# Patient Record
Sex: MALE | Race: WHITE | NOT HISPANIC OR LATINO | URBAN - METROPOLITAN AREA
[De-identification: names, ages, dates, MRNs, and addresses within clinical notes are randomized per-mention and may not be internally consistent; named-entity substitution may affect disease eponyms.]

---

## 2020-06-15 ENCOUNTER — CLAIMS CREATED FROM THE CLAIM WINDOW (OUTPATIENT)
Dept: URBAN - METROPOLITAN AREA TELEHEALTH 2 | Facility: TELEHEALTH | Age: 80
End: 2020-06-15
Payer: MEDICARE

## 2020-06-15 DIAGNOSIS — E11.43 TYPE 2 DIABETES MELLITUS WITH DIABETIC AUTONOMIC (POLY)NEUROPATHY: ICD-10-CM

## 2020-06-15 PROCEDURE — 99213 OFFICE O/P EST LOW 20 MIN: CPT | Performed by: INTERNAL MEDICINE

## 2020-06-15 RX ORDER — INSULIN LISPRO 100 [IU]/ML
SUBCUTANEOUSLY INJECT 10 UNITS INTO SKIN 3 TIMES A DAY INJECTION, SOLUTION INTRAVENOUS; SUBCUTANEOUS
Qty: 27 | Refills: 3 | Status: ACTIVE | COMMUNITY
Start: 2020-03-16 | End: 1900-01-01

## 2020-06-15 RX ORDER — RAMIPRIL 10 MG/1
TAKE 2 CAPSULES BY MOUTH ONCE DAILY FOR 90 DAYS CAPSULE ORAL
Qty: 180 | Refills: 0 | Status: ACTIVE | COMMUNITY
Start: 2020-04-06 | End: 1900-01-01

## 2020-06-15 RX ORDER — ASPIRIN 81 MG/1
TABLET, CHEWABLE ORAL
Qty: 0 | Refills: 0 | Status: ACTIVE | COMMUNITY
Start: 1900-01-01 | End: 1900-01-01

## 2020-06-15 RX ORDER — SAXAGLIPTIN 5 MG/1
TAKE 1 TABLET BY MOUTH DAILY TABLET, FILM COATED ORAL
Qty: 90 | Refills: 3 | Status: ACTIVE | COMMUNITY
Start: 2020-04-22 | End: 1900-01-01

## 2020-06-15 RX ORDER — TRIMETHOPRIM 100 MG/1
TABLET ORAL
Qty: 0 | Refills: 0 | Status: ACTIVE | COMMUNITY
Start: 1900-01-01 | End: 1900-01-01

## 2020-06-15 RX ORDER — AMLODIPINE BESYLATE 10 MG/1
TAKE 1 TABLET BY MOUTH EVERY DAY TABLET ORAL
Qty: 90 | Refills: 4 | Status: ACTIVE | COMMUNITY
Start: 2019-11-07 | End: 1900-01-01

## 2020-06-30 ENCOUNTER — ERX REFILL RESPONSE (OUTPATIENT)
Age: 80
End: 2020-06-30

## 2020-06-30 RX ORDER — RAMIPRIL 10 MG/1
TAKE 2 CAPSULES BY MOUTH ONCE DAILY FOR 90 DAYS CAPSULE ORAL
Qty: 180 | Refills: 0

## 2020-08-10 ENCOUNTER — OFFICE VISIT (OUTPATIENT)
Dept: URBAN - METROPOLITAN AREA CLINIC 92 | Facility: CLINIC | Age: 80
End: 2020-08-10
Payer: MEDICARE

## 2020-08-10 VITALS
HEIGHT: 69 IN | HEART RATE: 75 BPM | SYSTOLIC BLOOD PRESSURE: 155 MMHG | TEMPERATURE: 95.9 F | BODY MASS INDEX: 27.76 KG/M2 | DIASTOLIC BLOOD PRESSURE: 70 MMHG | WEIGHT: 187.4 LBS

## 2020-08-10 DIAGNOSIS — E11.43 TYPE 2 DIABETES MELLITUS WITH DIABETIC AUTONOMIC (POLY)NEUROPATHY: ICD-10-CM

## 2020-08-10 DIAGNOSIS — K31.84 GASTROPARESIS: ICD-10-CM

## 2020-08-10 DIAGNOSIS — C90.00 MULTIPLE MYELOMA, REMISSION STATUS UNSPECIFIED: ICD-10-CM

## 2020-08-10 DIAGNOSIS — D64.89 ANEMIA DUE TO OTHER CAUSE, NOT CLASSIFIED: ICD-10-CM

## 2020-08-10 PROCEDURE — 99213 OFFICE O/P EST LOW 20 MIN: CPT | Performed by: INTERNAL MEDICINE

## 2020-08-10 RX ORDER — ASPIRIN 81 MG/1
TABLET, CHEWABLE ORAL
Qty: 0 | Refills: 0 | Status: ACTIVE | COMMUNITY
Start: 1900-01-01 | End: 1900-01-01

## 2020-08-10 RX ORDER — INSULIN LISPRO 100 [IU]/ML
SUBCUTANEOUSLY INJECT 10 UNITS INTO SKIN 3 TIMES A DAY INJECTION, SOLUTION INTRAVENOUS; SUBCUTANEOUS
Qty: 27 | Refills: 3 | Status: ACTIVE | COMMUNITY
Start: 2020-03-16 | End: 1900-01-01

## 2020-08-10 RX ORDER — TRIMETHOPRIM 100 MG/1
TABLET ORAL
Qty: 0 | Refills: 0 | Status: ACTIVE | COMMUNITY
Start: 1900-01-01 | End: 1900-01-01

## 2020-08-10 RX ORDER — AMLODIPINE BESYLATE 10 MG/1
TAKE 1 TABLET BY MOUTH EVERY DAY TABLET ORAL
Qty: 90 | Refills: 4 | Status: ACTIVE | COMMUNITY
Start: 2019-11-07 | End: 1900-01-01

## 2020-08-10 RX ORDER — RAMIPRIL 10 MG/1
TAKE 2 CAPSULES BY MOUTH ONCE DAILY FOR 90 DAYS CAPSULE ORAL
Qty: 180 | Refills: 0 | Status: ACTIVE | COMMUNITY

## 2020-08-10 RX ORDER — SAXAGLIPTIN 5 MG/1
TAKE 1 TABLET BY MOUTH DAILY TABLET, FILM COATED ORAL
Qty: 90 | Refills: 3 | Status: ACTIVE | COMMUNITY
Start: 2020-04-22 | End: 1900-01-01

## 2020-08-11 LAB
BANDS: 1
BASO (ABSOLUTE): 0.2
BASOS: 4
BLASTS/BLAST LIKE CELLS: (no result)
EOS (ABSOLUTE): 0.9
EOS: 24
HEMATOCRIT: 38
HEMATOLOGY COMMENTS:: (no result)
HEMOGLOBIN: 12.5
IMMATURE CELLS: (no result)
IMMATURE GRANS (ABS): (no result)
IMMATURE GRANULOCYTES: (no result)
IRON BIND.CAP.(TIBC): 342
IRON SATURATION: 32
IRON: 110
LYMPHS (ABSOLUTE): 0.8
LYMPHS: 22
MCH: 33.4
MCHC: 32.9
MCV: 102
MEGAKARYOCYTES: (no result)
METAMYELOCYTES: (no result)
MONOCYTES(ABSOLUTE): 0.8
MONOCYTES: 22
MYELOCYTES: (no result)
NEUTROPHILS (ABSOLUTE): 1.1
NEUTROPHILS: 27
NRBC: (no result)
OTHER, LINEAGE UNCERTAIN: (no result)
PLATELETS: 94
PROMYELOCYTES: (no result)
RBC: 3.74
RDW: 15.4
UIBC: 232
WBC: 3.8

## 2020-09-28 ENCOUNTER — ERX REFILL RESPONSE (OUTPATIENT)
Age: 80
End: 2020-09-28

## 2020-09-28 RX ORDER — RAMIPRIL 10 MG/1
TAKE 2 CAPSULES BY MOUTH ONCE DAILY FOR 90 DAYS CAPSULE ORAL
Qty: 180 | Refills: 0

## 2020-10-19 ENCOUNTER — ERX REFILL RESPONSE (OUTPATIENT)
Age: 80
End: 2020-10-19

## 2020-10-19 RX ORDER — AMLODIPINE BESYLATE 10 MG/1
TAKE 1 TABLET BY MOUTH EVERY DAY TABLET ORAL
Qty: 90 | Refills: 0

## 2020-11-09 ENCOUNTER — OFFICE VISIT (OUTPATIENT)
Dept: URBAN - METROPOLITAN AREA CLINIC 92 | Facility: CLINIC | Age: 80
End: 2020-11-09

## 2020-11-30 ENCOUNTER — DASHBOARD ENCOUNTERS (OUTPATIENT)
Age: 80
End: 2020-11-30

## 2020-11-30 ENCOUNTER — OFFICE VISIT (OUTPATIENT)
Dept: URBAN - METROPOLITAN AREA CLINIC 92 | Facility: CLINIC | Age: 80
End: 2020-11-30
Payer: MEDICARE

## 2020-11-30 DIAGNOSIS — C90.00 MULTIPLE MYELOMA, REMISSION STATUS UNSPECIFIED: ICD-10-CM

## 2020-11-30 DIAGNOSIS — D64.89 ANEMIA DUE TO OTHER CAUSE, NOT CLASSIFIED: ICD-10-CM

## 2020-11-30 DIAGNOSIS — E11.43 TYPE 2 DIABETES MELLITUS WITH DIABETIC AUTONOMIC (POLY)NEUROPATHY: ICD-10-CM

## 2020-11-30 DIAGNOSIS — K31.84 GASTROPARESIS: ICD-10-CM

## 2020-11-30 PROBLEM — 271737000: Status: ACTIVE | Noted: 2020-08-10

## 2020-11-30 PROBLEM — 713704004: Status: ACTIVE | Noted: 2020-06-15

## 2020-11-30 PROCEDURE — 99214 OFFICE O/P EST MOD 30 MIN: CPT | Performed by: INTERNAL MEDICINE

## 2020-11-30 RX ORDER — ASPIRIN 81 MG/1
TABLET, CHEWABLE ORAL
Qty: 0 | Refills: 0 | Status: ACTIVE | COMMUNITY
Start: 1900-01-01 | End: 1900-01-01

## 2020-11-30 RX ORDER — RAMIPRIL 10 MG/1
TAKE 2 CAPSULES BY MOUTH ONCE DAILY FOR 90 DAYS CAPSULE ORAL
Qty: 180 | Refills: 0 | Status: ACTIVE | COMMUNITY

## 2020-11-30 RX ORDER — TRIMETHOPRIM 100 MG/1
TABLET ORAL
Qty: 0 | Refills: 0 | Status: ACTIVE | COMMUNITY
Start: 1900-01-01 | End: 1900-01-01

## 2020-11-30 RX ORDER — INSULIN LISPRO 100 [IU]/ML
SUBCUTANEOUSLY INJECT 10 UNITS INTO SKIN 3 TIMES A DAY INJECTION, SOLUTION INTRAVENOUS; SUBCUTANEOUS
Qty: 27 | Refills: 3 | Status: ACTIVE | COMMUNITY
Start: 2020-03-16 | End: 1900-01-01

## 2020-11-30 RX ORDER — AMLODIPINE BESYLATE 10 MG/1
TAKE 1 TABLET BY MOUTH EVERY DAY TABLET ORAL
Qty: 90 | Refills: 0 | Status: ACTIVE | COMMUNITY

## 2020-11-30 RX ORDER — SAXAGLIPTIN 5 MG/1
TAKE 1 TABLET BY MOUTH DAILY TABLET, FILM COATED ORAL
Qty: 90 | Refills: 3 | Status: ACTIVE | COMMUNITY
Start: 2020-04-22 | End: 1900-01-01

## 2020-11-30 NOTE — HPI-TODAY'S VISIT:
80-year-old male with gastroparesis which is well controlled on erythromycin no nausea no vomiting the patient has gained weight.  Multiple myeloma under Dr. Chacko controlled doing well on medication.  Recent negative evaluation of bone scan.  Hypertensive cardiovascular disease control at home elevated today we will continue observation.  Diabetes mellitus well controlled on Onglyza will repeat labs.  The patient is going to have a study because of an irregular heartbeat and a possible murmur ordered by Dr. Chacko. We will proceed with colonoscopy next year since it has been 5 years from the last colonoscopy, which demonstrated polyps at that time

## 2020-12-01 LAB
A/G RATIO: 1.3
ALBUMIN: 4.1
ALKALINE PHOSPHATASE: 50
ALT (SGPT): 21
AST (SGOT): 18
BILIRUBIN, TOTAL: 0.5
BUN/CREATININE RATIO: 20
BUN: 27
CALCIUM: 9.8
CARBON DIOXIDE, TOTAL: 22
CHLORIDE: 102
CREATININE: 1.38
EGFR IF AFRICN AM: 56
EGFR IF NONAFRICN AM: 49
GLOBULIN, TOTAL: 3.2
GLUCOSE: 204
HEMATOCRIT: 34.7
HEMATOLOGY COMMENTS:: (no result)
HEMOGLOBIN A1C: 7.3
HEMOGLOBIN: 11.3
MCH: 32
MCHC: 32.6
MCV: 98
NRBC: (no result)
PLATELETS: 116
POTASSIUM: 4.4
PROTEIN, TOTAL: 7.3
RBC: 3.53
RDW: 15.2
SODIUM: 139
WBC: 11.2

## 2020-12-21 ENCOUNTER — ERX REFILL RESPONSE (OUTPATIENT)
Age: 80
End: 2020-12-21

## 2020-12-21 RX ORDER — RAMIPRIL 10 MG/1
TAKE 2 CAPSULES BY MOUTH ONCE DAILY FOR 90 DAYS CAPSULE ORAL
Qty: 180 | Refills: 0

## 2021-03-29 ENCOUNTER — OFFICE VISIT (OUTPATIENT)
Dept: URBAN - METROPOLITAN AREA CLINIC 92 | Facility: CLINIC | Age: 81
End: 2021-03-29